# Patient Record
Sex: MALE | Race: WHITE | Employment: FULL TIME
[De-identification: names, ages, dates, MRNs, and addresses within clinical notes are randomized per-mention and may not be internally consistent; named-entity substitution may affect disease eponyms.]

---

## 2023-04-14 ENCOUNTER — HOSPITAL ENCOUNTER (OUTPATIENT)
Facility: HOSPITAL | Age: 68
Discharge: HOME | End: 2023-04-17

## 2023-04-14 LAB — SENTARA SPECIMEN COLLECTION: NORMAL

## 2023-04-14 PROCEDURE — 99001 SPECIMEN HANDLING PT-LAB: CPT

## 2023-04-21 ENCOUNTER — HOSPITAL ENCOUNTER (OUTPATIENT)
Facility: HOSPITAL | Age: 68
Discharge: HOME | End: 2023-04-23
Payer: COMMERCIAL

## 2023-04-21 DIAGNOSIS — M25.571 RIGHT ANKLE PAIN, UNSPECIFIED CHRONICITY: ICD-10-CM

## 2023-04-21 PROCEDURE — 93005 ELECTROCARDIOGRAM TRACING: CPT

## 2023-04-24 LAB
EKG ATRIAL RATE: 62 BPM
EKG DIAGNOSIS: NORMAL
EKG P AXIS: 41 DEGREES
EKG P-R INTERVAL: 150 MS
EKG Q-T INTERVAL: 396 MS
EKG QRS DURATION: 76 MS
EKG QTC CALCULATION (BAZETT): 401 MS
EKG R AXIS: 21 DEGREES
EKG T AXIS: 23 DEGREES
EKG VENTRICULAR RATE: 62 BPM

## 2023-08-02 ENCOUNTER — HOSPITAL ENCOUNTER (OUTPATIENT)
Facility: HOSPITAL | Age: 68
Discharge: HOME | End: 2023-08-05
Attending: ORTHOPAEDIC SURGERY
Payer: COMMERCIAL

## 2023-08-02 DIAGNOSIS — G89.29 CHRONIC PAIN OF RIGHT KNEE: ICD-10-CM

## 2023-08-02 DIAGNOSIS — M25.561 CHRONIC PAIN OF RIGHT KNEE: ICD-10-CM

## 2023-08-02 PROCEDURE — 73700 CT LOWER EXTREMITY W/O DYE: CPT

## 2023-09-29 ENCOUNTER — HOSPITAL ENCOUNTER (OUTPATIENT)
Facility: HOSPITAL | Age: 68
Discharge: HOME | End: 2023-10-02

## 2023-09-29 ENCOUNTER — TRANSCRIBE ORDERS (OUTPATIENT)
Facility: HOSPITAL | Age: 68
End: 2023-09-29

## 2023-09-29 DIAGNOSIS — M17.11 PRIMARY OSTEOARTHRITIS OF RIGHT KNEE: Primary | ICD-10-CM

## 2023-09-29 LAB — SENTARA SPECIMEN COLLECTION: NORMAL

## 2023-10-04 ENCOUNTER — HOSPITAL ENCOUNTER (OUTPATIENT)
Facility: HOSPITAL | Age: 68
Discharge: HOME | End: 2023-10-07

## 2023-10-04 LAB — SENTARA SPECIMEN COLLECTION: NORMAL

## 2023-11-15 ENCOUNTER — HOSPITAL ENCOUNTER (OUTPATIENT)
Facility: HOSPITAL | Age: 68
Setting detail: RECURRING SERIES
Discharge: HOME | End: 2023-11-18
Payer: COMMERCIAL

## 2023-11-15 PROCEDURE — 97162 PT EVAL MOD COMPLEX 30 MIN: CPT

## 2023-11-17 ENCOUNTER — HOSPITAL ENCOUNTER (OUTPATIENT)
Facility: HOSPITAL | Age: 68
Setting detail: RECURRING SERIES
Discharge: HOME | End: 2023-11-20
Payer: COMMERCIAL

## 2023-11-17 PROCEDURE — 97112 NEUROMUSCULAR REEDUCATION: CPT

## 2023-11-17 PROCEDURE — 97016 VASOPNEUMATIC DEVICE THERAPY: CPT

## 2023-11-17 PROCEDURE — 97140 MANUAL THERAPY 1/> REGIONS: CPT

## 2023-11-17 PROCEDURE — 97110 THERAPEUTIC EXERCISES: CPT

## 2023-11-17 PROCEDURE — 97530 THERAPEUTIC ACTIVITIES: CPT

## 2023-11-17 NOTE — PROGRESS NOTES
demo a SLS of >15 sec bilaterally to improve balance required for a return to PLOF and dancing activities. Eval:SLS: right = 0-1 sec, left = 1-2sec stable surface      4. The pt will demo a AROM of right knee of 0 deg extension and >120 deg flexion to show an improvement in knee mobility required for stairs and amb. Eval:    Knee - AROM Left Right   Flexion 127 109   Extension Lacking 2 deg Lacking 22deg         5. Pt FOTO score will increase to 68 points to show improvement in functional activity tolerance and improve QOL.    Eval:51  Current: will address at visit 5  *FOTO score is an established functional score where 100 = no disability*      PLAN  Yes  Continue plan of care  []  Upgrade activities as tolerated  []  Discharge due to :  []  Other:    Marilou George PTA    11/17/2023    7:52 AM    Future Appointments   Date Time Provider [address]   11/17/2023  1:20 PM Marilou George PTA [facility]   11/20/2023  3:20 PM Genaro Su, PT [facility]   11/22/2023 10:40 AM Marilou George, PTA [facility]   11/24/2023 12:40 PM Genaro Su, PT [facility]   11/27/2023  1:20 PM Marilou George, PTA [facility]   11/29/2023  1:20 PM Marilou George, PTA [facility]   12/1/2023 10:00 AM Marilou George, PTA [facility]   12/4/2023 10:40 AM Colleen Martino, PT [facility]   12/6/2023  1:20 PM Marilou George, PTA [facility]   12/8/2023  2:00 PM Colleen Martino, PT [facility]   12/11/2023  2:00 PM Colleen Martino, PT [facility]   12/13/2023  1:20 PM Genaro Su, PT [facility]   12/15/2023  1:20 PM Genaro Su, PT [facility]   12/18/2023  1:20 PM Genaro Su, PT [facility]   12/20/2023  1:20 PM Marilou Chambers, PTA [facility]   12/22/2023  1:20 PM Marilou Chambers, PTA [facility]   12/26/2023  1:20 PM Colleen Martino, PT [facility]

## 2023-11-20 ENCOUNTER — HOSPITAL ENCOUNTER (OUTPATIENT)
Facility: HOSPITAL | Age: 68
Setting detail: RECURRING SERIES
Discharge: HOME | End: 2023-11-23
Payer: COMMERCIAL

## 2023-11-20 PROCEDURE — 97112 NEUROMUSCULAR REEDUCATION: CPT

## 2023-11-20 PROCEDURE — 97140 MANUAL THERAPY 1/> REGIONS: CPT

## 2023-11-20 PROCEDURE — 97016 VASOPNEUMATIC DEVICE THERAPY: CPT

## 2023-11-20 PROCEDURE — 97110 THERAPEUTIC EXERCISES: CPT

## 2023-11-20 NOTE — PROGRESS NOTES
introduce foam balance and squats next session. Patient continues to make fair progress toward goals and would benefit from continued skilled PT intervention to address remaining deficits outlined in goals below. Right knee AROM:   Lacking  deg beginning of session  Lacking  deg end of session    Patient will continue to benefit from skilled PT / OT services to modify and progress therapeutic interventions, analyze and address functional mobility deficits, analyze and address ROM deficits, analyze and address strength deficits, analyze and address soft tissue restrictions, and analyze and modify for postural abnormalities to address functional deficits and attain remaining goals. Progress toward goals / Updated goals:  []  See Progress Note/Recertification    Short Term Goals: STG- To be accomplished in 4 week(s):  1. Pt will be compliant with HEP to encourage prophylaxis. Eval:provided and reviewed  Current 11/20/23: pt reports consistency, no issues      2. Pt will report worst pain of 0-1/10 to show improvement in functional activity tolerance and improve QOL. Eval:worst pain = 3-4/10   Current: 11/17/23 1/10      Long Term Goals: LTG- To be accomplished in 8 week(s):  1. Pt will complete 30sec STS from a table elevated to 18in, 0 UE, >14x to demo an increase in functional LE strength required for a return to PLOF. Eval:30sec STS: table elevated to 19in, 0 UE, 9x     2. Pt will improve right knee flexion/extension strength via LE Dynamometer by 10# each to demo an increase in strength required for stairs and amb. Eval:LE Dynamometer: Right = extension = 30#, flexion = 20#; Left : extension = 45#, flexion = 35#     3. Pt will demo a SLS of >15 sec bilaterally to improve balance required for a return to PLOF and dancing activities. Eval:SLS: right = 0-1 sec, left = 1-2sec stable surface   Current 11/20/23: no change since IE     4.   Pt FOTO score will increase to 68 points to show

## 2023-11-22 ENCOUNTER — HOSPITAL ENCOUNTER (OUTPATIENT)
Facility: HOSPITAL | Age: 68
Setting detail: RECURRING SERIES
Discharge: HOME | End: 2023-11-25
Payer: COMMERCIAL

## 2023-11-22 PROCEDURE — 97016 VASOPNEUMATIC DEVICE THERAPY: CPT

## 2023-11-22 PROCEDURE — 97112 NEUROMUSCULAR REEDUCATION: CPT

## 2023-11-22 PROCEDURE — 97140 MANUAL THERAPY 1/> REGIONS: CPT

## 2023-11-22 PROCEDURE — 97110 THERAPEUTIC EXERCISES: CPT

## 2023-11-22 PROCEDURE — 97530 THERAPEUTIC ACTIVITIES: CPT

## 2023-11-22 NOTE — PROGRESS NOTES
PHYSICAL / OCCUPATIONAL THERAPY - DAILY TREATMENT NOTE (updated )    Patient Name: Reji Sandoval    Date: 2023    : 1955  Insurance: Payor: Saroj Douglas / Plan: [employer] HMO / Product Type: *No Product type* /      Patient  verified Yes     Visit #   Current / Total 4 24   Time   In / Out 1040 1135   Pain   In / Out 1 0   Subjective Functional Status/Changes: Reports walking on the treadmill at the gym   Changes to: Allergies, Med Hx, Sx Hx?   no       TREATMENT AREA =  Pain in right knee [M25.561]    OBJECTIVE    Modalities Rationale:     decrease inflammation and decrease pain to improve patient's ability to progress to PLOF and address remaining functional goals. min [] Estim Unattended, type/location:                                                            []  w/ice    []  w/heat     min [] Estim Attended, type/location:                                                           []  w/US     []  w/ice    []  w/heat    []  TENS insruct       min []  Mechanical Traction: type/lbs                    []  pro   []  sup   []  int   []  cont    []  before manual    []  after manual     min []  Ultrasound, settings/location:        min []  Iontophoresis w/ dexamethasone, location:                                                []  take home patch       []  in clinic           min  unbilled []  Ice     []  Heat    location/position:       min []  Paraffin,  details:     10 min [x]  Vasopneumatic Device, press/temp: 34, low     min []  Beryl Benitez / Karen Maggy: If using vaso (only need to measure limb vaso being performed on)      pre-treatment girth : 47.1  cm      post-treatment girth : 45 cm      measured at (landmark location) :  right knee joint line     min []  Other:     Skin assessment post-treatment (if applicable):    []  intact    []  redness- no adverse reaction                 []redness - adverse reaction:         Therapeutic Procedures:   Tx Min Billable or 1:1 Min (if diff from

## 2023-11-24 ENCOUNTER — HOSPITAL ENCOUNTER (OUTPATIENT)
Facility: HOSPITAL | Age: 68
Setting detail: RECURRING SERIES
Discharge: HOME | End: 2023-11-27
Payer: COMMERCIAL

## 2023-11-24 PROCEDURE — 97530 THERAPEUTIC ACTIVITIES: CPT

## 2023-11-24 PROCEDURE — 97140 MANUAL THERAPY 1/> REGIONS: CPT

## 2023-11-24 PROCEDURE — 97110 THERAPEUTIC EXERCISES: CPT

## 2023-11-24 PROCEDURE — 97016 VASOPNEUMATIC DEVICE THERAPY: CPT

## 2023-11-24 NOTE — PROGRESS NOTES
elevated to 19in, 0 UE, 9x     2. Pt will improve right knee flexion/extension strength via LE Dynamometer by 10# each to demo an increase in strength required for stairs and amb. Eval:LE Dynamometer: Right = extension = 30#, flexion = 20#; Left : extension = 45#, flexion = 35#     3. Pt will demo a SLS of >15 sec bilaterally to improve balance required for a return to PLOF and dancing activities. Eval:SLS: right = 0-1 sec, left = 1-2sec stable surface      4. The pt will demo a AROM of right knee of 0 deg extension and >120 deg flexion to show an improvement in knee mobility required for stairs and amb. Eval:    Knee - AROM Left Right   Flexion 127 109   Extension Lacking 2 deg Lacking 22deg     Status on: 11/22/23   AROM  deg  After MT AROM 5-120 deg         5. Pt FOTO score will increase to 68 points to show improvement in functional activity tolerance and improve QOL.    Eval:51  Current 11/24/23: 61  *FOTO score is an established functional score where 100 = no disability*            PLAN  Yes  Continue plan of care  []  Upgrade activities as tolerated  []  Discharge due to :  []  Other:    Parisa Stapleton PT    11/24/2023    11:42 AM    Future Appointments   Date Time Provider [address]   11/24/2023 12:40 PM Parisa Stapleton, PT [facility]   11/27/2023  1:20 PM Zoila Francois, PT [facility]   11/29/2023  1:20 PM Chelsea Barillas, PTA [facility]   12/1/2023 10:00 AM Chelsea Barillas, PTA [facility]   12/4/2023 10:40 AM Parisa Stapleton, PT [facility]   12/6/2023  1:20 PM Chelsea Barillas, PTA [facility]   12/8/2023  2:00 PM Parisa Stapleton, PT [facility]   12/11/2023  2:00 PM Parisa Daya, PT [facility]   12/13/2023  1:20 PM Vester Squire, PT [facility]   12/15/2023  1:20 PM Vester Squire, PT [facility]   12/18/2023  1:20 PM Vester Squire, PT [facility]   12/20/2023  1:20 PM Benna Strandquist, PTA [facility]   12/22/2023  1:20 PM Benna Strandquist, PTA [facility]

## 2023-11-27 ENCOUNTER — HOSPITAL ENCOUNTER (OUTPATIENT)
Facility: HOSPITAL | Age: 68
Setting detail: RECURRING SERIES
Discharge: HOME | End: 2023-11-30
Payer: COMMERCIAL

## 2023-11-27 PROCEDURE — 97016 VASOPNEUMATIC DEVICE THERAPY: CPT

## 2023-11-27 PROCEDURE — 97530 THERAPEUTIC ACTIVITIES: CPT

## 2023-11-27 PROCEDURE — 97110 THERAPEUTIC EXERCISES: CPT

## 2023-11-27 PROCEDURE — 97140 MANUAL THERAPY 1/> REGIONS: CPT

## 2023-11-27 NOTE — PROGRESS NOTES
PHYSICAL / OCCUPATIONAL THERAPY - DAILY TREATMENT NOTE (updated )    Patient Name: Jomarie Skiff    Date: 2023    : 1955  Insurance: Payor: Vidal Buddyt / Plan: [employer] HMO / Product Type: *No Product type* /      Patient  verified Yes     Visit #   Current / Total 5 24   Time   In / Out 126 218   Pain   In / Out 1 0   Subjective Functional Status/Changes: Mild constant pain mary back of the knee but also in front   Changes to: Allergies, Med Hx, Sx Hx?   no       TREATMENT AREA =  Pain in right knee [M25.561]    OBJECTIVE    Modalities Rationale:     decrease edema, decrease inflammation, decrease pain, and increase tissue extensibility to improve patient's ability to progress to PLOF and address remaining functional goals. min [] Estim Unattended, type/location:                                      []  w/ice    []  w/heat    min [] Estim Attended, type/location:                                     []  w/US     []  w/ice    []  w/heat    []  TENS insruct      min []  Mechanical Traction: type/lbs                   []  pro   []  sup   []  int   []  cont    []  before manual    []  after manual    min []  Ultrasound, settings/location:      min []  Iontophoresis w/ dexamethasone, location:                                               []  take home patch       []  in clinic        min  unbilled []  Ice     []  Heat    location/position:     min []  Paraffin,  details:    10 min [x]  Vasopneumatic Device, press/temp: Right knee in supine    min []  Chucho Zendejas / Bev Bridget: If using vaso (only need to measure limb vaso being performed on)      pre-treatment girth : 45.5 cm      post-treatment girth :45 cm       measured at (landmark location) : mid patella     min []  Other:    Skin assessment post-treatment (if applicable):    []  intact    []  redness- no adverse reaction                 []redness - adverse reaction:         Therapeutic Procedures:   Tx Min Billable or 1:1 Min (if diff from [employer])

## 2023-11-29 ENCOUNTER — APPOINTMENT (OUTPATIENT)
Facility: HOSPITAL | Age: 68
End: 2023-11-29
Payer: COMMERCIAL

## 2023-12-04 ENCOUNTER — HOSPITAL ENCOUNTER (OUTPATIENT)
Facility: HOSPITAL | Age: 68
Setting detail: RECURRING SERIES
Discharge: HOME | End: 2023-12-07
Payer: COMMERCIAL

## 2023-12-04 PROCEDURE — 97110 THERAPEUTIC EXERCISES: CPT

## 2023-12-04 PROCEDURE — 97016 VASOPNEUMATIC DEVICE THERAPY: CPT

## 2023-12-04 PROCEDURE — 97140 MANUAL THERAPY 1/> REGIONS: CPT

## 2023-12-04 PROCEDURE — 97530 THERAPEUTIC ACTIVITIES: CPT

## 2023-12-04 NOTE — PROGRESS NOTES
PHYSICAL / OCCUPATIONAL THERAPY - DAILY TREATMENT NOTE (updated )    Patient Name: Trecia Shone    Date: 2023    : 1955  Insurance: Payor: Shawn Him / Plan: [employer] HMO / Product Type: *No Product type* /      Patient  verified Yes     Visit #   Current / Total 6 24   Time   In / Out 10:40am 11:45am   Pain   In / Out 1 0   Subjective Functional Status/Changes: Pt reports he got a call by FO and understands today will D/C   Changes to: Allergies, Med Hx, Sx Hx?   no       TREATMENT AREA =  Pain in right knee [M25.561]    OBJECTIVE    Modalities Rationale:     {[employer] Modality Rationale:96068} to improve patient's ability to progress to PLOF and address remaining functional goals. min [] Estim Unattended, type/location:                                      []  w/ice    []  w/heat    min [] Estim Attended, type/location:                                     []  w/US     []  w/ice    []  w/heat    []  TENS insruct      min []  Mechanical Traction: type/lbs                   []  pro   []  sup   []  int   []  cont    []  before manual    []  after manual    min []  Ultrasound, settings/location:      min []  Iontophoresis w/ dexamethasone, location:                                               []  take home patch       []  in clinic        min  unbilled []  Ice     []  Heat    location/position:     min []  Paraffin,  details:     min []  Vasopneumatic Device, press/temp:     min []  Yoanna Salon / Delos Overcast: If using vaso (only need to measure limb vaso being performed on)      pre-treatment girth :       post-treatment girth :       measured at (landmark location) :      min []  Other:    Skin assessment post-treatment (if applicable):    []  intact    []  redness- no adverse reaction                 []redness - adverse reaction:         Therapeutic Procedures:   Tx Min Billable or 1:1 Min (if diff from Tx Min) Procedure, Rationale, Specifics   ***  30841 Therapeutic Exercise (timed):  increase ROM,